# Patient Record
Sex: FEMALE | Race: WHITE | NOT HISPANIC OR LATINO | Employment: FULL TIME
[De-identification: names, ages, dates, MRNs, and addresses within clinical notes are randomized per-mention and may not be internally consistent; named-entity substitution may affect disease eponyms.]

---

## 2017-06-03 ENCOUNTER — HEALTH MAINTENANCE LETTER (OUTPATIENT)
Age: 27
End: 2017-06-03

## 2023-06-19 ENCOUNTER — HOSPITAL ENCOUNTER (EMERGENCY)
Facility: CLINIC | Age: 33
Discharge: HOME | End: 2023-06-19
Attending: EMERGENCY MEDICINE | Admitting: EMERGENCY MEDICINE
Payer: COMMERCIAL

## 2023-06-19 ENCOUNTER — APPOINTMENT (OUTPATIENT)
Dept: GENERAL RADIOLOGY | Facility: CLINIC | Age: 33
End: 2023-06-19
Attending: EMERGENCY MEDICINE
Payer: COMMERCIAL

## 2023-06-19 VITALS
RESPIRATION RATE: 17 BRPM | OXYGEN SATURATION: 95 % | DIASTOLIC BLOOD PRESSURE: 90 MMHG | TEMPERATURE: 97.6 F | HEART RATE: 89 BPM | SYSTOLIC BLOOD PRESSURE: 155 MMHG

## 2023-06-19 DIAGNOSIS — S93.401A SPRAIN OF RIGHT ANKLE, UNSPECIFIED LIGAMENT, INITIAL ENCOUNTER: ICD-10-CM

## 2023-06-19 PROCEDURE — 99283 EMERGENCY DEPT VISIT LOW MDM: CPT

## 2023-06-19 PROCEDURE — 73610 X-RAY EXAM OF ANKLE: CPT | Mod: RT

## 2023-06-19 PROCEDURE — 73630 X-RAY EXAM OF FOOT: CPT | Mod: RT

## 2023-06-19 ASSESSMENT — ACTIVITIES OF DAILY LIVING (ADL)
ADLS_ACUITY_SCORE: 35

## 2023-06-19 NOTE — ED PROVIDER NOTES
History   Chief Complaint:  Ankle Pain     The history is provided by the patient and medical records.      Bethany Lei is a 33 year old female who presents with right ankle and foot pain. The patient states she stepped on her right foot wrong 2 days ago. She woke up the next morning with pain and swelling to her right ankle and foot. Due to continued symptoms, she presents to the emergency department today for further evaluation. She has increased pain with weight bearing on the right lower extremity. She denies history of fracture in her right foot. She has been using an orthopedic shoe she had at home.     Independent Historian:   None - Patient Only    Review of External Notes:   None    Medications:    Estarylla     Past Medical History:    Abnormal Pap smear of cervix    Past Surgical History:    [facility] teeth extraction      Physical Exam     Patient Vitals for the past 24 hrs:   BP Temp Temp src Pulse Resp SpO2   06/19/23 1320 (!) 155/90 97.6  F (36.4  C) Temporal 89 17 95 %      Physical Exam  Nursing note and vitals reviewed.  Constitutional:  Appears well-developed and well-nourished.   HENT:   Head:    Atraumatic.   Mouth/Throat:   Oropharynx is clear and moist. No oropharyngeal exudate.   Eyes:    Pupils are equal, round, and reactive to light.   Neck:    Normal range of motion. Neck supple.      No tracheal deviation present. No thyromegaly present.   Cardiovascular:  Normal rate, regular rhythm, no murmur   Pulmonary/Chest: Breath sounds are clear and equal without wheezes or crackles.  Abdominal:   Soft. Bowel sounds are normal. Exhibits no distension and      no mass. There is no tenderness.      There is no rebound and no guarding.   Musculoskeletal:  Exhibits no edema.      There is tenderness and swelling to the lateral aspect of the right ankle and 5th metatarsal of her right foot. Good DP pulse.  Sensation intact to the foot.  Proximal tib/fib non tender.  Lymphadenopathy:  No cervical  adenopathy.   Neurological:   Alert and oriented to person, place, and time.   Skin:    Skin is warm and dry. No rash noted. No pallor.     Emergency Department Course   Imaging:  Ankle XR, G/E 3 views, right   Final Result   IMPRESSION: No acute osseous abnormality demonstrated.      BJ RICO MD            SYSTEM ID:  [id number]      Foot  XR, G/E 3 views, right   Final Result   IMPRESSION: No acute osseous abnormality demonstrated.      BJ RICO MD            SYSTEM ID:  [id number]      Report per radiology    Emergency Department Course & Assessments:     Interventions:  Medications - No data to display     Assessments:  1324 I obtained history and performed an exam of the patient as documented above.  1421 I rechecked the patient and explained x-ray results. Discussed plan of care.   The patient was placed in an air splint by ED tech and was given crutches.     Independent Interpretation (X-rays, CTs, rhythm strip):  I reviewed the patient's right ankle and foot x-ray images. I see no fracture or dislocation.     Consultations/Discussion of Management or Tests:  None     Social Determinants of Health affecting care:   None    Disposition:  The patient was discharged to home.     Impression & Plan    Medical Decision Making:  Bethany Lei is a 33 year old female who presents for evaluation of ankle pain.  Signs and symptoms are consistent with an ankle sprain.  This involves tenderness and swelling of the lateral ankle by clinical exam. No signs of septic arthritis, gout, pseudogout, fracture, cellulitis, etc.  There are no signs of fracture.  The patients neurovascular status is normal. A head to toe trauma exam is otherwise negative; the likelihood of other serious sequelae of trauma (spine, head, chest, abdomen, other extremities, pelvis) is low.  Plan is for avoidance of weight bearing for 3-4 days, then weight bear as tolerated/protected weightbearing, RICE treatment with ice 15 minutes  on, 1 hour off, ace wrap.  Patient will advance weightbearing and follow-up with orthopedic surgery in 1-2 weeks.  They will begin gentle ROM exercises of the ankle including PF,DF, alphabet exercises.     Diagnosis:    ICD-10-CM    1. Sprain of right ankle, unspecified ligament, initial encounter  S93.401A           Scribe Disclosure:  I, Chelle Hernández, am serving as a scribe at 2:13 PM on 6/19/2023 to document services personally performed by Tamera Ca MD based on my observations and the provider's statements to me.      Tamera Ca MD  06/19/23 1615       Tamera Ca MD  06/19/23 1617       Tamera Ca MD  06/19/23 2022

## 2023-06-19 NOTE — LETTER
June 19, 2023      To Whom It May Concern:      Bethany Lei was seen in our Emergency Department today, 06/19/23.  I expect her condition to improve over the next 1--2 days.  She may return to work/school when improved.    Sincerely,        MATTEO Ram

## 2023-06-30 ENCOUNTER — DOCUMENTATION ONLY (OUTPATIENT)
Dept: EMERGENCY MEDICINE | Facility: CLINIC | Age: 33
End: 2023-06-30
Payer: COMMERCIAL

## 2023-06-30 DIAGNOSIS — S93.401A SPRAIN OF RIGHT ANKLE, UNSPECIFIED LIGAMENT, INITIAL ENCOUNTER: Primary | ICD-10-CM

## 2023-10-29 ENCOUNTER — HEALTH MAINTENANCE LETTER (OUTPATIENT)
Age: 33
End: 2023-10-29

## 2024-12-21 ENCOUNTER — HEALTH MAINTENANCE LETTER (OUTPATIENT)
Age: 34
End: 2024-12-21